# Patient Record
Sex: FEMALE | Race: WHITE | NOT HISPANIC OR LATINO | Employment: OTHER | URBAN - METROPOLITAN AREA
[De-identification: names, ages, dates, MRNs, and addresses within clinical notes are randomized per-mention and may not be internally consistent; named-entity substitution may affect disease eponyms.]

---

## 2017-08-07 ENCOUNTER — TRANSCRIBE ORDERS (OUTPATIENT)
Dept: ADMINISTRATIVE | Facility: HOSPITAL | Age: 52
End: 2017-08-07

## 2017-08-07 DIAGNOSIS — Z12.31 VISIT FOR SCREENING MAMMOGRAM: Primary | ICD-10-CM

## 2017-08-11 ENCOUNTER — HOSPITAL ENCOUNTER (OUTPATIENT)
Dept: MAMMOGRAPHY | Facility: HOSPITAL | Age: 52
Discharge: HOME | End: 2017-08-11
Attending: NURSE PRACTITIONER | Admitting: NURSE PRACTITIONER

## 2017-08-11 DIAGNOSIS — Z12.31 VISIT FOR SCREENING MAMMOGRAM: ICD-10-CM

## 2017-08-11 PROCEDURE — 77063 BREAST TOMOSYNTHESIS BI: CPT

## 2017-08-11 PROCEDURE — G0202 SCR MAMMO BI INCL CAD: HCPCS

## 2017-08-11 PROCEDURE — 77067 SCR MAMMO BI INCL CAD: CPT | Performed by: RADIOLOGY

## 2017-08-11 PROCEDURE — 77063 BREAST TOMOSYNTHESIS BI: CPT | Performed by: RADIOLOGY

## 2017-10-04 ENCOUNTER — APPOINTMENT (OUTPATIENT)
Dept: MAMMOGRAPHY | Facility: HOSPITAL | Age: 52
End: 2017-10-04

## 2018-08-15 ENCOUNTER — TRANSCRIBE ORDERS (OUTPATIENT)
Dept: ADMINISTRATIVE | Facility: HOSPITAL | Age: 53
End: 2018-08-15

## 2018-08-15 DIAGNOSIS — Z12.31 VISIT FOR SCREENING MAMMOGRAM: Primary | ICD-10-CM

## 2018-08-30 ENCOUNTER — HOSPITAL ENCOUNTER (OUTPATIENT)
Dept: MAMMOGRAPHY | Facility: HOSPITAL | Age: 53
Discharge: HOME | End: 2018-08-30
Attending: NURSE PRACTITIONER | Admitting: NURSE PRACTITIONER

## 2018-08-30 DIAGNOSIS — Z12.31 VISIT FOR SCREENING MAMMOGRAM: ICD-10-CM

## 2018-08-30 PROCEDURE — 77067 SCR MAMMO BI INCL CAD: CPT | Performed by: RADIOLOGY

## 2018-08-30 PROCEDURE — 77067 SCR MAMMO BI INCL CAD: CPT

## 2018-08-30 PROCEDURE — 77063 BREAST TOMOSYNTHESIS BI: CPT | Performed by: RADIOLOGY

## 2018-08-30 PROCEDURE — 77063 BREAST TOMOSYNTHESIS BI: CPT

## 2019-08-12 ENCOUNTER — TRANSCRIBE ORDERS (OUTPATIENT)
Dept: ADMINISTRATIVE | Facility: HOSPITAL | Age: 54
End: 2019-08-12

## 2019-08-12 DIAGNOSIS — Z12.31 VISIT FOR SCREENING MAMMOGRAM: Primary | ICD-10-CM

## 2019-09-30 ENCOUNTER — HOSPITAL ENCOUNTER (OUTPATIENT)
Dept: MAMMOGRAPHY | Facility: HOSPITAL | Age: 54
Discharge: HOME | End: 2019-09-30
Admitting: NURSE PRACTITIONER

## 2019-09-30 DIAGNOSIS — Z12.31 VISIT FOR SCREENING MAMMOGRAM: ICD-10-CM

## 2019-09-30 PROCEDURE — 77067 SCR MAMMO BI INCL CAD: CPT | Performed by: RADIOLOGY

## 2019-09-30 PROCEDURE — 77067 SCR MAMMO BI INCL CAD: CPT

## 2019-09-30 PROCEDURE — 77063 BREAST TOMOSYNTHESIS BI: CPT

## 2019-09-30 PROCEDURE — 77063 BREAST TOMOSYNTHESIS BI: CPT | Performed by: RADIOLOGY

## 2019-10-02 ENCOUNTER — TELEPHONE (OUTPATIENT)
Dept: MAMMOGRAPHY | Facility: HOSPITAL | Age: 54
End: 2019-10-02

## 2019-10-02 NOTE — TELEPHONE ENCOUNTER
Patient called in with questions regarding the need for additional views. Questions answered, support given.  Encouraged patient to call back with questions/concerns.  Patient verbalized understanding.

## 2019-10-04 ENCOUNTER — HOSPITAL ENCOUNTER (OUTPATIENT)
Dept: ULTRASOUND IMAGING | Facility: HOSPITAL | Age: 54
Discharge: HOME | End: 2019-10-04
Admitting: RADIOLOGY

## 2019-10-04 DIAGNOSIS — R92.8 ABNORMAL MAMMOGRAM: ICD-10-CM

## 2019-10-04 PROCEDURE — 76642 ULTRASOUND BREAST LIMITED: CPT

## 2019-10-04 PROCEDURE — 76642 ULTRASOUND BREAST LIMITED: CPT | Performed by: RADIOLOGY

## 2020-08-19 ENCOUNTER — TRANSCRIBE ORDERS (OUTPATIENT)
Dept: ADMINISTRATIVE | Facility: HOSPITAL | Age: 55
End: 2020-08-19

## 2020-08-19 DIAGNOSIS — Z12.31 VISIT FOR SCREENING MAMMOGRAM: Primary | ICD-10-CM

## 2020-11-12 ENCOUNTER — HOSPITAL ENCOUNTER (OUTPATIENT)
Dept: MAMMOGRAPHY | Facility: HOSPITAL | Age: 55
Discharge: HOME | End: 2020-11-12
Admitting: NURSE PRACTITIONER

## 2020-11-12 DIAGNOSIS — Z12.31 VISIT FOR SCREENING MAMMOGRAM: ICD-10-CM

## 2020-11-12 PROCEDURE — 77063 BREAST TOMOSYNTHESIS BI: CPT

## 2020-11-12 PROCEDURE — 77063 BREAST TOMOSYNTHESIS BI: CPT | Performed by: RADIOLOGY

## 2020-11-12 PROCEDURE — 77067 SCR MAMMO BI INCL CAD: CPT | Performed by: RADIOLOGY

## 2020-11-12 PROCEDURE — 77067 SCR MAMMO BI INCL CAD: CPT

## 2021-09-28 ENCOUNTER — TRANSCRIBE ORDERS (OUTPATIENT)
Dept: ADMINISTRATIVE | Facility: HOSPITAL | Age: 56
End: 2021-09-28

## 2021-09-28 DIAGNOSIS — Z12.31 VISIT FOR SCREENING MAMMOGRAM: Primary | ICD-10-CM

## 2021-11-15 ENCOUNTER — APPOINTMENT (OUTPATIENT)
Dept: MAMMOGRAPHY | Facility: HOSPITAL | Age: 56
End: 2021-11-15

## 2021-12-23 ENCOUNTER — HOSPITAL ENCOUNTER (OUTPATIENT)
Dept: MAMMOGRAPHY | Facility: HOSPITAL | Age: 56
Discharge: HOME | End: 2021-12-23
Admitting: NURSE PRACTITIONER

## 2021-12-23 DIAGNOSIS — Z12.31 VISIT FOR SCREENING MAMMOGRAM: ICD-10-CM

## 2021-12-23 PROCEDURE — 77067 SCR MAMMO BI INCL CAD: CPT | Performed by: RADIOLOGY

## 2021-12-23 PROCEDURE — 77063 BREAST TOMOSYNTHESIS BI: CPT

## 2021-12-23 PROCEDURE — 77063 BREAST TOMOSYNTHESIS BI: CPT | Performed by: RADIOLOGY

## 2021-12-23 PROCEDURE — 77067 SCR MAMMO BI INCL CAD: CPT

## 2022-11-03 ENCOUNTER — TRANSCRIBE ORDERS (OUTPATIENT)
Dept: ADMINISTRATIVE | Facility: HOSPITAL | Age: 57
End: 2022-11-03

## 2022-11-03 DIAGNOSIS — Z12.31 SCREENING MAMMOGRAM FOR BREAST CANCER: Primary | ICD-10-CM

## 2022-12-27 ENCOUNTER — HOSPITAL ENCOUNTER (OUTPATIENT)
Dept: MAMMOGRAPHY | Facility: HOSPITAL | Age: 57
Discharge: HOME | End: 2022-12-27
Admitting: OBSTETRICS & GYNECOLOGY

## 2022-12-27 DIAGNOSIS — Z12.31 SCREENING MAMMOGRAM FOR BREAST CANCER: ICD-10-CM

## 2022-12-27 PROCEDURE — 77067 SCR MAMMO BI INCL CAD: CPT | Performed by: RADIOLOGY

## 2022-12-27 PROCEDURE — 77067 SCR MAMMO BI INCL CAD: CPT

## 2022-12-27 PROCEDURE — 77063 BREAST TOMOSYNTHESIS BI: CPT | Performed by: RADIOLOGY

## 2022-12-27 PROCEDURE — 77063 BREAST TOMOSYNTHESIS BI: CPT

## 2023-11-08 ENCOUNTER — TRANSCRIBE ORDERS (OUTPATIENT)
Dept: ADMINISTRATIVE | Facility: HOSPITAL | Age: 58
End: 2023-11-08
Payer: COMMERCIAL

## 2023-11-08 DIAGNOSIS — Z12.31 SCREENING MAMMOGRAM FOR BREAST CANCER: Primary | ICD-10-CM

## 2024-01-02 ENCOUNTER — HOSPITAL ENCOUNTER (OUTPATIENT)
Dept: MAMMOGRAPHY | Facility: HOSPITAL | Age: 59
Discharge: HOME | End: 2024-01-02
Admitting: OBSTETRICS & GYNECOLOGY
Payer: COMMERCIAL

## 2024-01-02 DIAGNOSIS — Z12.31 SCREENING MAMMOGRAM FOR BREAST CANCER: ICD-10-CM

## 2024-01-02 PROCEDURE — 77067 SCR MAMMO BI INCL CAD: CPT

## 2024-01-02 PROCEDURE — 77063 BREAST TOMOSYNTHESIS BI: CPT

## 2024-01-11 ENCOUNTER — HOSPITAL ENCOUNTER (OUTPATIENT)
Dept: ULTRASOUND IMAGING | Facility: HOSPITAL | Age: 59
Discharge: HOME | End: 2024-01-11
Payer: COMMERCIAL

## 2024-01-11 ENCOUNTER — HOSPITAL ENCOUNTER (OUTPATIENT)
Dept: MAMMOGRAPHY | Facility: HOSPITAL | Age: 59
Discharge: HOME | End: 2024-01-11
Payer: COMMERCIAL

## 2024-01-11 DIAGNOSIS — R92.8 ABNORMAL MAMMOGRAM: ICD-10-CM

## 2024-01-11 PROCEDURE — G0279 TOMOSYNTHESIS, MAMMO: HCPCS

## 2024-01-11 PROCEDURE — 76642 ULTRASOUND BREAST LIMITED: CPT

## 2024-01-11 PROCEDURE — 77065 DX MAMMO INCL CAD UNI: CPT

## 2024-03-14 ENCOUNTER — LAB (OUTPATIENT)
Dept: LAB | Facility: HOSPITAL | Age: 59
End: 2024-03-14
Payer: COMMERCIAL

## 2024-03-14 ENCOUNTER — OFFICE VISIT (OUTPATIENT)
Dept: CARDIOLOGY | Facility: CLINIC | Age: 59
End: 2024-03-14
Payer: COMMERCIAL

## 2024-03-14 VITALS
OXYGEN SATURATION: 97 % | SYSTOLIC BLOOD PRESSURE: 136 MMHG | WEIGHT: 130 LBS | BODY MASS INDEX: 20.4 KG/M2 | HEIGHT: 67 IN | DIASTOLIC BLOOD PRESSURE: 88 MMHG

## 2024-03-14 DIAGNOSIS — Z91.89 CARDIOVASCULAR RISK FACTOR: ICD-10-CM

## 2024-03-14 DIAGNOSIS — Z91.89 CARDIOVASCULAR RISK FACTOR: Primary | ICD-10-CM

## 2024-03-14 LAB
ALBUMIN SERPL-MCNC: 4.4 G/DL (ref 3.5–5.2)
ALBUMIN/GLOB SERPL: 2.1 G/DL
ALP SERPL-CCNC: 43 U/L (ref 39–117)
ALT SERPL W P-5'-P-CCNC: 42 U/L (ref 1–33)
ANION GAP SERPL CALCULATED.3IONS-SCNC: 8 MMOL/L (ref 5–15)
AST SERPL-CCNC: 32 U/L (ref 1–32)
BASOPHILS # BLD AUTO: 0.05 10*3/MM3 (ref 0–0.2)
BASOPHILS NFR BLD AUTO: 0.7 % (ref 0–1.5)
BILIRUB SERPL-MCNC: 0.4 MG/DL (ref 0–1.2)
BUN SERPL-MCNC: 22 MG/DL (ref 6–20)
BUN/CREAT SERPL: 30.1 (ref 7–25)
CALCIUM SPEC-SCNC: 9.3 MG/DL (ref 8.6–10.5)
CHLORIDE SERPL-SCNC: 104 MMOL/L (ref 98–107)
CHOLEST SERPL-MCNC: 218 MG/DL (ref 0–200)
CO2 SERPL-SCNC: 27 MMOL/L (ref 22–29)
CREAT SERPL-MCNC: 0.73 MG/DL (ref 0.57–1)
CRP SERPL-MCNC: 0.3 MG/DL (ref 0.01–0.5)
DEPRECATED RDW RBC AUTO: 39.6 FL (ref 37–54)
EGFRCR SERPLBLD CKD-EPI 2021: 96.1 ML/MIN/1.73
EOSINOPHIL # BLD AUTO: 0.12 10*3/MM3 (ref 0–0.4)
EOSINOPHIL NFR BLD AUTO: 1.7 % (ref 0.3–6.2)
ERYTHROCYTE [DISTWIDTH] IN BLOOD BY AUTOMATED COUNT: 12.2 % (ref 12.3–15.4)
GLOBULIN UR ELPH-MCNC: 2.1 GM/DL
GLUCOSE SERPL-MCNC: 104 MG/DL (ref 65–99)
HBA1C MFR BLD: 5.4 % (ref 4.8–5.6)
HCT VFR BLD AUTO: 43.2 % (ref 34–46.6)
HDLC SERPL-MCNC: 86 MG/DL (ref 40–60)
HGB BLD-MCNC: 14.3 G/DL (ref 12–15.9)
IMM GRANULOCYTES # BLD AUTO: 0.02 10*3/MM3 (ref 0–0.05)
IMM GRANULOCYTES NFR BLD AUTO: 0.3 % (ref 0–0.5)
IRON 24H UR-MRATE: 147 MCG/DL (ref 37–145)
IRON SATN MFR SERPL: 44 % (ref 20–50)
LDLC SERPL CALC-MCNC: 119 MG/DL (ref 0–100)
LDLC/HDLC SERPL: 1.36 {RATIO}
LYMPHOCYTES # BLD AUTO: 1.01 10*3/MM3 (ref 0.7–3.1)
LYMPHOCYTES NFR BLD AUTO: 14.1 % (ref 19.6–45.3)
MCH RBC QN AUTO: 29.7 PG (ref 26.6–33)
MCHC RBC AUTO-ENTMCNC: 33.1 G/DL (ref 31.5–35.7)
MCV RBC AUTO: 89.8 FL (ref 79–97)
MONOCYTES # BLD AUTO: 0.42 10*3/MM3 (ref 0.1–0.9)
MONOCYTES NFR BLD AUTO: 5.9 % (ref 5–12)
NEUTROPHILS NFR BLD AUTO: 5.52 10*3/MM3 (ref 1.7–7)
NEUTROPHILS NFR BLD AUTO: 77.3 % (ref 42.7–76)
NRBC BLD AUTO-RTO: 0 /100 WBC (ref 0–0.2)
PLATELET # BLD AUTO: 292 10*3/MM3 (ref 140–450)
PMV BLD AUTO: 10.1 FL (ref 6–12)
POTASSIUM SERPL-SCNC: 4.3 MMOL/L (ref 3.5–5.2)
PREALB SERPL-MCNC: 24.2 MG/DL (ref 20–40)
PROT SERPL-MCNC: 6.5 G/DL (ref 6–8.5)
RBC # BLD AUTO: 4.81 10*6/MM3 (ref 3.77–5.28)
SODIUM SERPL-SCNC: 139 MMOL/L (ref 136–145)
TIBC SERPL-MCNC: 337 MCG/DL (ref 298–536)
TRANSFERRIN SERPL-MCNC: 226 MG/DL (ref 200–360)
TRIGL SERPL-MCNC: 77 MG/DL (ref 0–150)
VIT B12 BLD-MCNC: 577 PG/ML (ref 211–946)
VLDLC SERPL-MCNC: 13 MG/DL (ref 5–40)
WBC NRBC COR # BLD AUTO: 7.14 10*3/MM3 (ref 3.4–10.8)

## 2024-03-14 PROCEDURE — 86141 C-REACTIVE PROTEIN HS: CPT

## 2024-03-14 PROCEDURE — 84134 ASSAY OF PREALBUMIN: CPT

## 2024-03-14 PROCEDURE — 36415 COLL VENOUS BLD VENIPUNCTURE: CPT

## 2024-03-14 PROCEDURE — 83540 ASSAY OF IRON: CPT

## 2024-03-14 PROCEDURE — 83036 HEMOGLOBIN GLYCOSYLATED A1C: CPT

## 2024-03-14 PROCEDURE — 85025 COMPLETE CBC W/AUTO DIFF WBC: CPT

## 2024-03-14 PROCEDURE — 82652 VIT D 1 25-DIHYDROXY: CPT

## 2024-03-14 PROCEDURE — 99204 OFFICE O/P NEW MOD 45 MIN: CPT | Performed by: INTERNAL MEDICINE

## 2024-03-14 PROCEDURE — 80053 COMPREHEN METABOLIC PANEL: CPT

## 2024-03-14 PROCEDURE — 84466 ASSAY OF TRANSFERRIN: CPT

## 2024-03-14 PROCEDURE — 93000 ELECTROCARDIOGRAM COMPLETE: CPT | Performed by: INTERNAL MEDICINE

## 2024-03-14 PROCEDURE — 82607 VITAMIN B-12: CPT

## 2024-03-14 PROCEDURE — 80061 LIPID PANEL: CPT

## 2024-03-14 RX ORDER — NORETHINDRONE ACETATE AND ETHINYL ESTRADIOL AND FERROUS FUMARATE 5-7-9-7
1 KIT ORAL DAILY
COMMUNITY
Start: 2024-01-12

## 2024-03-14 RX ORDER — FLUTICASONE PROPIONATE AND SALMETEROL 100; 50 UG/1; UG/1
1 POWDER RESPIRATORY (INHALATION)
COMMUNITY
Start: 2024-01-16

## 2024-03-14 RX ORDER — MULTIPLE VITAMINS W/ MINERALS TAB 9MG-400MCG
1 TAB ORAL DAILY
COMMUNITY

## 2024-03-14 RX ORDER — ALBUTEROL SULFATE 90 UG/1
1 AEROSOL, METERED RESPIRATORY (INHALATION)
COMMUNITY

## 2024-03-14 NOTE — PROGRESS NOTES
New Cardiology Patient Office Visit      Date: 2024  Patient Name: Genny Camacho  : 1965   MRN: [id number]   PCP: Provider, No Known   Referring Provider: Referring, Self     Chief Complaint:    Chief Complaint   Patient presents with    Establish Care     Just a check up        History of Present Illness: Genny Camacho is a 58 y.o. female who is here today for evaluation for cardiac risk factors.  Her father has heart disease but that was in 70s that she knows of.  She is very active and does regular exercise.  She denies any chest pain any shortness of breath any dizziness any palpitations or any other symptoms.  Her blood pressure is a little high in the office today but she has checked her blood pressure and has been fine.  She never had a problem with her blood pressure.    She denies any weight loss or any weight gain.  She denies any paroxysmal nocturnal dyspnea.  She does not have any problem when she does exercise.      Problem List   CARDIAC  Coronary Artery Disease:   Moderate risk     Myocardium:   Normal     Valvular:   No known valvular disease     Electrical:   NSR     Pericardium:   Normal     CARDIAC RISK FACTORS:  Family History    NON-CARDIAC  Allergies    SURGERIES  Uterine Fibroid Surgery      Subjective      Review of Systems:   Review of Systems   All other systems reviewed and are negative.      Medications:   Current Outpatient Medications   Medication Sig Dispense Refill    albuterol sulfate  (90 Base) MCG/ACT inhaler Inhale 1 puff 4 (Four) Times a Day.      Collagen-Vitamin C-Biotin (COLLAGEN 1500/C PO) Take  by mouth.      multivitamin with minerals tablet tablet Take 1 tablet by mouth Daily.      norethindrone-ethinyl estradiol-iron (Tri-Legest Fe) 1-20/1-30/1-35 MG-MCG tablet Take 1 tablet by mouth Daily.      Wixela Inhub 100-50 MCG/ACT DISKUS Inhale 1 puff 2 (Two) Times a Day.       No current facility-administered medications for this  "visit.           The following portions of the patient's history were reviewed and updated as appropriate: allergies, current medications, past family history, past medical history, past social history, past surgical history and problem list.    Objective     Physical Exam:  Vital Signs:   Vitals:    03/14/24 0844 03/14/24 0848   BP: 146/94 136/88   BP Location: Right arm Left arm   Patient Position: Sitting Sitting   SpO2: 97%    Weight: 59 kg (130 lb)    Height: 170.2 cm (67\")      Body mass index is 20.36 kg/m².     Constitutional:       General: Not in acute distress.     Appearance: Healthy appearance. Not in distress.     Neck:     JVP:Not elevated     Carotid artery: No carotid bruit    Pulmonary:      Effort: Pulmonary effort is normal.      Breath sounds: Normal breath sounds. No wheezing. No rhonchi. No rales.     Cardiovascular:      Normal rate. Regular rhythm. Normal S1. Normal S2.      Murmurs: There is no murmur.      No gallop. No click. No rub.     Abdominal:      General: Bowel sounds are normal.      Palpations: Abdomen is soft.      Tenderness: There is no abdominal tenderness.    Extremities:     Pulses: Good distal pulses     Edema: No edema    Labs:  Lab Results   Component Value Date    GLUCOSE 79 09/10/2015    BUN 17 09/10/2015    CREATININE 0.7 09/10/2015    K 4.0 09/10/2015    CO2 29 09/10/2015    CALCIUM 9.3 09/10/2015    ALBUMIN 4.0 09/10/2015    AST 26 09/10/2015    ALT 28 09/10/2015     Lab Results   Component Value Date    WBC 6.45 09/10/2015    HGB 14.1 09/10/2015    HCT 40.1 09/10/2015    MCV 86.1 09/10/2015     09/10/2015       Lab Results   Component Value Date    TSH 2.881 09/10/2015             ECG 12 Lead    Date/Time: 3/14/2024 8:53 AM  Performed by: Curtis Badillo MD    Authorized by: Curtis Badillo MD  Previous ECG: no previous ECG available  Rhythm: sinus rhythm    Clinical impression: normal ECG          Smoking Cessation:   Tobacco Product History : Patient " never smoked    Advance Care Planning   ACP discussion was declined by the patient. Patient does not have an advance directive, declines further assistance.            Assessment / Plan      Assessment:   Diagnosis Plan   1. Cardiovascular risk factor             Plan:  Patient does not have any significant risk factors for coronary artery disease.  We will go ahead and check her blood work for risk assessment stratification.  We will also get calcium scoring for further adjustment if needed.  She is aware that her blood pressure is a little high in the office but she knows that it has been running fine.  Will get microalbumin urea also to look for further causes of cardiac problem.            Follow Up:   Return in about 1 year (around 3/14/2025).    Curtis Badillo MD

## 2024-03-15 ENCOUNTER — TELEPHONE (OUTPATIENT)
Dept: CARDIOLOGY | Facility: CLINIC | Age: 59
End: 2024-03-15
Payer: COMMERCIAL

## 2024-03-16 LAB — 1,25(OH)2D SERPL-MCNC: 91.6 PG/ML (ref 24.8–81.5)

## 2024-07-17 ENCOUNTER — TELEPHONE (OUTPATIENT)
Dept: CARDIOLOGY | Facility: CLINIC | Age: 59
End: 2024-07-17
Payer: COMMERCIAL

## 2024-07-17 DIAGNOSIS — R00.2 PALPITATIONS: Primary | ICD-10-CM

## 2024-07-17 NOTE — TELEPHONE ENCOUNTER
"Patient called concerned because she felt a \"PVC in her heart this morning\" and she was pretty dehydrated. She stated this has happened twice in her life time and she wants to get it checked out.  Educated patient on water intake. Will send her holter monitor.   "

## 2024-08-08 ENCOUNTER — TELEPHONE (OUTPATIENT)
Dept: CARDIOLOGY | Facility: CLINIC | Age: 59
End: 2024-08-08
Payer: COMMERCIAL

## 2024-08-09 ENCOUNTER — TELEPHONE (OUTPATIENT)
Dept: CARDIOLOGY | Facility: CLINIC | Age: 59
End: 2024-08-09

## 2024-08-09 RX ORDER — ASPIRIN 81 MG/1
81 TABLET ORAL DAILY
Qty: 90 TABLET | Refills: 3 | Status: SHIPPED | OUTPATIENT
Start: 2024-08-09

## 2024-08-09 NOTE — TELEPHONE ENCOUNTER
----- Message from Curtis Badillo sent at 8/9/2024  2:00 PM EDT -----  Please call the patient regarding her abnormal result.  She only needs to take aspirin

## 2024-08-13 ENCOUNTER — LAB (OUTPATIENT)
Dept: LAB | Facility: HOSPITAL | Age: 59
End: 2024-08-13
Payer: COMMERCIAL

## 2024-08-13 ENCOUNTER — CLINICAL SUPPORT (OUTPATIENT)
Dept: GENETICS | Facility: HOSPITAL | Age: 59
End: 2024-08-13
Payer: COMMERCIAL

## 2024-08-13 DIAGNOSIS — Z80.3 FAMILY HISTORY OF MALIGNANT NEOPLASM OF BREAST: ICD-10-CM

## 2024-08-13 DIAGNOSIS — Z13.79 GENETIC TESTING: Primary | ICD-10-CM

## 2024-08-13 DIAGNOSIS — Z80.0 FAMILY HISTORY OF PANCREATIC CANCER: ICD-10-CM

## 2024-08-13 PROCEDURE — 36415 COLL VENOUS BLD VENIPUNCTURE: CPT

## 2024-08-13 PROCEDURE — 96040: CPT

## 2024-08-13 NOTE — PROGRESS NOTES
Encounter Date: 2024   Patient Name: Genny Camacho  : 1965   MRN: [id number]     Referring Provider: Vince Guardado MD     REASON FOR VISIT  Genny Camacho is a 59 y.o. female referred for genetic counseling due to a family history of breast and pancreatic cancer.  Ms. Camacho was interested in discussing her risk for a hereditary cancer syndrome and genetic testing for cancer susceptibility.  Ms. Camacho does not have a personal history of cancer.  She reached menarche at age 14 and has never had children.  Her menopause status is unknown and has never used HRT.  She retains her ovaries and uterus.  Her most recent mammogram described her breast as heterogeneously dense.  Ms. Camacho elected to pursue genetic testing via [facility]-Expanded panel with [facility] analyzing 71-cancer-risk genes.    PERTINENT FAMILY HISTORY:  A cancer-focused four-generation pedigree was obtained via patient reporting    Paternal Grandmother - breast cancer, 79  Paternal Uncle - brain cancer, 77  Paternal 1st Cousin - pancreatic cancer, 58  Paternal Great Uncle (brother of PGF) - unknown cancer, 80  Maternal Uncle - esophageal cancer, 88  Maternal 1st Cousin - follicular thyroid cancer, 40    RISK ASSESSMENT  Ms. Camacho's reported family history is suggestive of a hereditary cancer risk.  Ms. Camacho meets NCCN guidelines criteria for genetic testing based on she has a paternal second degree relative diagnosed with breast cancer whom has a close blood relative diagnosed with pancreatic cancer.  A significant proportion (>50%) of hereditary breast and ovarian cancer can be attributed to mutations in the BRCA1 and BRCA2 genes.  Females with mutations in BRCA1/2  can have a lifetime breast cancer risk up to 60 - 84%, while the general population risk for breast cancer is 12 - 13%.  BRCA1 mutations can also increase the lifetime risk for ovarian cancer up to 58% and BRCA2 mutations can  increase this risk up to 29%.  The general population lifetime ovarian cancer risk is 1 - 2%.  BRCA1/2 mutations can also significantly increase the lifetime risk of certain cancers in males such as prostate cancer.  Mutations in these two genes can also increase the risk for pancreatic cancer and male breast cancer.  There are other clinically significant cancer related genes, as well as other, more rare, hereditary cancer syndromes than can increase risk for breast and ovarian cancers. The degree of risk and screening recommendations vary by gene, the individual's age, and related family history.    GENETIC COUNSELING  We reviewed the family history information in detail. Cases of cancer follow three general patterns: sporadic, familial, and hereditary.  While most cancer cases are sporadic (~70% of cancer cases), some cases appear to occur in family clusters.  These cases are said to be familial and account for around 20% of cancer cases.  Familial cases may be due to a combination of shared genes and environmental factors among family members that we cannot evaluate via genetic testing at this time.  In 5% - 10% of cancer cases, the risk for cancer is inherited, and the genes responsible for the increased cancer risk are known.      Family histories typical of hereditary cancer syndromes usually include multiple first- and second-degree relatives diagnosed with cancer types that define a syndrome.  These cases tend to be diagnosed at younger-than-expected ages and can be bilateral or multifocal.  The cancer in these families follows an autosomal dominant inheritance pattern, which indicates the likely presence of a mutation in a cancer risk gene.  Children and siblings of an individual carrying a mutation have a 50% chance of inheriting that mutation, thereby inheriting the increased risk to develop cancer.  However, it is not recommended to pursue genetic testing for adult-onset cancers in children as the  "results would not have clinical utility until some time in adulthood among other ethical reasons.  These mutations can be passed down from the maternal or the paternal lineage.    We discussed that risk factors or \"red flags\" for hereditary risk include cancers diagnosed at earlier-than-average ages, multiple family members diagnosed with cancers associated with mutations in the same gene, or multiple generations of associated cancers. Dependent on the specific cancer type or syndrome, there exists the potential for several clinically significant genes related to the cancer's onset or increased risk for development.  Therefore, the standard approach to hereditary cancer genetic testing at this time is via multi-gene panel analyzing several genes associated with a hereditary risk for cancer.  Once results are completed, we will review them in the context of the patient's personal and family history to determine what, if any, post-test cancer risk management changes are recommended.  When affected relatives are unavailable or unwilling to pursue genetic testing, it is reasonable to offer genetic testing to an unaffected individual.      GENETIC TESTING  The risks, benefits, and limitations of genetic testing and implications for clinical management following testing were reviewed.  Genetic test results can influence decisions regarding screening and prevention.  Genetic testing can have significant psychological implications for both individuals and families. Also discussed was the possibility of insurance discrimination based on genetic test results and the laws in place to prevent this, as well as the limitations of these laws.      The Genetic Information Nondiscrimination Act (MCKENZIE) is a federal law enacted in May 2008.  [place] prohibits discrimination on the basis of genetic information such as genetic test results with respect to health insurance and employment status.  Under Title 1 of [place], health insurance " "companies are prohibited from using an individual's genetic information to change premiums, drop or change an individual's coverage, or prevent coverage from being acquired.  Title 2 of [place]CK[place] prohibits employers from using genetic information in employment decisions such as, but not limited to, hiring, firing, promotions, pay, and job assignments.  MCKENZIE protections do not protect against genetic discrimination by life insurance, long-term care or disability insurance,  service members, federal employees, those using the  Health Service, or those employed by a small business with fewer than 15 employees.    We discussed panel testing, which could involve testing numerous genes associated with increased cancer risk. The implications of a positive,negative, or VUS test result were discussed.  We also discussed the importance of testing an affected relative. In general, a negative genetic test result is most informative if a mutation has first been established in an affected member of the family.  In cases where an affected individual is not available or interested in testing, it is appropriate to offer testing to an unaffected individual.     With multigene panel testing, it is not uncommon for a variant of uncertain significance (VUS) to be identified.  Variants are termed \"VUS\" when there is not sufficient evidence to classify the variant as a negative (not harmful) variant or a positive (harmful) mutation. Genetic testing labs work to reclassify all VUSs overtime and will issue an updated report when a reclassification occurs.  The majority (over 90%) of VUSs that are reclassified are found to be negative genetic variants, however a small percentage will be upgraded to a harmful mutation. Clinically, a VUS is treated as a negative result.  If genetic testing is negative or a variant of uncertain significance (VUS) is found, management should be guided by a family history-based risk assessment.  Medical " care should not be altered based on a VUS result.  Genetic testing for other unaffected (never had cancer) relatives is not recommended for a VUS.    We discussed that there are limitations to testing an individual who has not had cancer.  A negative test result does not mean Ms. Wilkinss risk for cancer is low or even normal, although the risk would not be as high as it would with positive (harmful mutation) result.  It could still be increased over the general population based on family history alone.  If Ms. Camacho tests negative it could be for several different reasons such as:    The possibility that there is a pathogenic variant causing cancer in the family, and that gene was on the patient's test, but Ms. Camacho did not inherit it. We cannot know if this is the case by only testing the patient.  A familial mutation could therefore still be present in the family and other close family members are still at risk.  Ms. Camacho could have a pathogenic variant in one of the genes tested for that was not detected. Current testing will identify the overwhelming majority of pathogenic variants, but some are not detectable with current technology.   Ms. Camacho may carry a pathogenic variant in another gene associated with hereditary cancer predisposition that was not tested for, or the risk in the family may be due to other genetic factors that are not well understood.    ASSESSMENT AND PLAN  Ms. Camacho meets NCCN guidelines for genetic testing.  We reviewed the options for genetic testing including a multi-gene panel of high risk genes, a panel of genes with known management guidelines, or a broader approach including more newly discovered genes.  We reviewed the benefits and drawbacks of this approach, including finding mutations in genes that are less well understood, or results that are unexpected based on this family history.  Ms. Camacho elected to pursue genetic testing.  [facility]Next -  Expanded panel was ordered, which analyzes 71 genes associated with an increased cancer risk. The genes on this panel include AIP, ALK, APC, MATILDA, AXIN2, BAP1, BARD1, BMPR1A, BRCA1, BRCA2, BRIP1, CDC73, CDH1, CDK4, CDKN1B, CDKN2A, CHEK2, CTNNA1, DICER1, EGFR, EGLN1, EPCAM, FANCC, FH, FLCN, GALNT12, HOXB13, KIF1B, KIT, LZTR1, MAX, MEN1, MET, MITF, MLH1, MSH2, MSH3, MSH6, MUTYH, NF1, NF2, NTHL1, PALB2, PDGFRA, PHOX2B, PMS2, POLD1, POLE, POT1, BNFCF5C, PTCH1, PTEN, RAD51C, RAD51D, RB1, RET, SDHA, SDHAF2, SDHB, SDHC, SDHD, SMAD4, SMARCA4, SMARCB1, SMARCE1, STK11, SUFU, VXLC058, TP53, TSC1, TSC2, and VHL.  A blood sample for genetic evaluation was collected at [facility].  Genetic testing results are expected in 2 - 4 weeks from the day the sample is received by [facility]'s lab.  We will contact the patient when results are received.  Ms. Camacho asked excellent questions during the consult and did not demonstrate any acute psychosocial distress during our visit.  This clinic encounter was 45 minutes.      Armando Smith MS  Genetic Counselor  [facility]

## 2024-08-26 ENCOUNTER — TELEPHONE (OUTPATIENT)
Dept: GENETICS | Facility: HOSPITAL | Age: 59
End: 2024-08-26
Payer: COMMERCIAL

## 2024-08-26 NOTE — TELEPHONE ENCOUNTER
Spoke with patient and disclosed negative genetic results. Informed patient these results would be on [facility] and sent to her Dr. Patient requested a copy be mailed to her.

## 2024-08-27 ENCOUNTER — DOCUMENTATION (OUTPATIENT)
Dept: GENETICS | Facility: HOSPITAL | Age: 59
End: 2024-08-27
Payer: COMMERCIAL

## 2024-08-27 NOTE — PROGRESS NOTES
Date of Visit: 2024  Name: Genny Camacho  : 1965  MRN: [id number]    Referring Provider: Vince Guardado MD        REASON FOR CONSULT  Genny Camacho is a 59 y.o. female referred for genetic counseling due to a family history of breast and pancreatic cancer.  Ms. Camacho was interested in discussing her risk for a hereditary cancer syndrome and genetic testing for cancer susceptibility.  Ms. Camacho does not have a personal history of cancer.  She reached menarche at age 14 and has never had children.  Her menopause status is unknown and has never used HRT.  She retains her ovaries and uterus.  Her most recent mammogram described her breast as heterogeneously dense.  Ms. Camacho elected to pursue genetic testing via [facility]Expanded panel with [facility] analyzing 71-cancer-risk genes.  Ms. Camacho's genetic test result was NEGATIVE for pathogenic mutations in all 71 - cancer risk genes analyzed.  Her estimated lifetime breast cancer risk is 12.9% as calculated by the Tyrer-Cuzick model.  Ms. Camacho was notified of these results via telephone on 2024 .     PERTINENT FAMILY HISTORY:  A cancer-focused four-generation pedigree was obtained via patient reporting     Paternal Grandmother - breast cancer, 79  Paternal Uncle - brain cancer, 77  Paternal 1st Cousin - pancreatic cancer, 58  Paternal Great Uncle (brother of PGF) - unknown cancer, 80  Maternal Uncle - esophageal cancer, 88  Maternal 1st Cousin - follicular thyroid cancer, 40    GENETIC TESTING RESULTS AND RECOMMENDATIONS  Genetic testing was performed by [facility] analyzing 71-cancer-risk genes.    Genetic testing was negative for pathogenic mutations by sequencing, rearrangement testing, and RNA analysis for the 71 genes analyzed.  This result does not clarify the cause of Ms. Camacho's family history of cancer, nor does it absolutely rule out a hereditary cause either.  There could  be a mutation in the family that explains Ms. Camacho's family history of cancer that she did not inherit.  There could be a mutation in the family that explains the pattern of cancer that we cannot identify at this time or in a gene that was not tested.  There could be a mutation in one of the genes tested that was not detected. Current genetic testing technologies will identify the majority of mutations with high accuracy, but some remain undetectable at this time.  Ms. Wilkinss family history of cancer could be occurring not due to a single gene mutation, but due to multiple undetectable genetic and environmental factors.  We encourage patients to reach out over time to inquire about any new methodologies of testing or newly identified cancer risk genes that could explain their personal or family history of cancer.    Increased cancer risks for Ms. Camacho and her family may remain due to her family history of cancer.  The Tyrer-Cuzick model (MIRNA) is a validated female breast cancer risk tool that assesses an unaffected (has not had breast cancer) female's lifetime breast cancer risk.  Tyrer-Cuzick version 8.0b was used to calculate Ms. Wilkinss lifetime breast cancer risk.  Ms. Camacho's estimated lifetime breast cancer risk is 12.9%.  This model predicts the average 58 y.o. unaffected female's lifetime risk of breast cancer to be 7.7%.  The National Comprehensive Cancer Network guidelines (version 2.2024) recommend the following for 58 y.o. females who have an less than a 20% lifetime risk of breast cancer calculated from validated models such as the Tyrer-Cuzick model:    Annual clinical breast exam  Annual mammogram with tomosynthesis  Practice breast awareness    GENETIC COUNSELING  We reviewed the family history and her personal history information in detail. Cases of cancer follow three general patterns: sporadic, familial, and hereditary.  While most cancer cases are sporadic (~70% of cancer cases),  "some cases appear to occur in family clusters.  These cases are said to be familial and account for around 20% of cancer cases.  Familial cases may be due to a combination of shared genes and environmental factors among family members that we cannot evaluate via genetic testing at this time.  In 5% - 10% of cancer cases, the risk for cancer is inherited, and the genes responsible for the increased cancer risk are known.       Family histories typical of hereditary cancer syndromes usually include multiple first- and second-degree relatives diagnosed with cancer types that define a syndrome.  These cases tend to be diagnosed at younger-than-expected ages and can be bilateral or multifocal.  The cancer in these families follows an autosomal dominant inheritance pattern, which indicates the likely presence of a mutation in a cancer gene.  Children and siblings of an individual carrying a mutation have a 50% chance of inheriting that mutation, thereby inheriting the increased risk to develop cancer.  However, it is not recommended to pursue genetic testing for adult-onset cancers in children as the results would not have clinical utility until some time in adulthood among other ethical reasons.  These mutations can be passed down from the maternal or the paternal lineage.     We discussed that risk factors or \"red flags\" for hereditary risk include cancers diagnosed at earlier-than-average ages, multiple family members diagnosed with cancers associated with mutations in the same gene, or multiple generations of associated cancers. Dependent on the specific cancer type or syndrome, there exists the potential for several clinically significant genes related to the cancer's onset or increased risk for development.  Therefore, the standard approach to hereditary cancer genetic testing at this time is via multi-gene panel analyzing several genes associated with a hereditary risk for cancer.  We reviewed the results in the " "context of the patient's personal and family history to determine what, if any, post-test cancer risk management changes are recommended.     GENETIC TESTING  The risks, benefits, and limitations of genetic testing and implications for clinical management following testing were reviewed.  Genetic test results can influence decisions regarding screening and prevention.  Genetic testing can have significant psychological implications for both individuals and families. Also discussed was the possibility of insurance discrimination based on genetic test results and the laws in place to prevent this, as well as the limitations of these laws.       The Genetic Information Nondiscrimination Act (MCKENZIE) is a federal law enacted in May 2008.  [place]IE prohibits discrimination on the basis of genetic information such as genetic test results with respect to health insurance and employment status.  Under Title 1 of [place], health insurance companies are prohibited from using an individual's genetic information to change premiums, drop or change an individual's coverage, or prevent coverage from being acquired.  Title 2 of MCK[place]IE prohibits employers from using genetic information in employment decisions such as, but not limited to, hiring, firing, promotions, pay, and job assignments.  MCKENZIE protections do not protect against genetic discrimination by life insurance, long-term care or disability insurance,  service members, federal employees, those using the [facility] [facility], or those employed by a small business with fewer than 15 employees.     We discussed the implications and limitations of a positive, negative, or variant of uncertain significance (VUS) test result.  Variants are termed \"VUS\" when there is not sufficient evidence to classify the variant as a negative (not harmful) variant or a positive (harmful) mutation. Genetic testing labs work to reclassify all VUSs overtime and will issue an updated report when a " reclassification occurs.  The majority (over 90%) of VUSs that are reclassified are found to be negative genetic variants, however a small percentage will be upgraded to a harmful mutation. Clinically, a VUS is treated as a negative result.  If genetic testing is negative or a variant of uncertain significance (VUS) is found, management should be guided by a family history-based risk assessment.  Medical care should not be altered based on a VUS result.  Genetic testing for other unaffected (never had cancer) relatives is not recommended for a VUS.     FAMILY CONSIDERATIONS  Genetic testing for Ms. Camacho's relatives may be informative despite Ms. Camacho's negative test result.  Her paternal family history of pancreatic and breast cancer can be suggestive of a hereditary cancer-causing mutation existing in the family that Ms. Camacho did not inherit.  Therefore, any of her siblings and paternal blood relatives would be appropriate candidates for genetic counseling services.  Testing unaffected family members may result in negative results as well or VUS's that would not clarify Ms. Camacho's family history of cancer.  Family members are encouraged to discuss their family history of cancer and appropriate cancer screening recommendations with their healthcare providers.  Family members are also encouraged to inquire about information regarding genetics and genetic testing, if appropriate, at a clinic that offers genetic counseling or their healthcare provider.  We would be happy to see relatives in our clinic for genetic counseling and testing.  They can request a referral from their healthcare provider to [facility] and that will prompt a coordinator to call them for an appointment.   For family members who live elsewhere, there are genetic counselors at most [facility].  They can find a genetic counselor by visiting the National Society of Genetic Counselors website at  www.nsgc.org or they can call our office and we would be happy to give them the contact information of the closest genetic counselor.        SUMMARY  Ms. Camacho's hereditary cancer genetic test identified no pathogenic mutations explaining her family history of cancer, nor increasing her personal cancer risk.  Her estimated lifetime breast cancer risk is 12.9%.  It is recommended that Ms. Camacho follow her provider's recommendations for future cancer screening and future risk reduction.  A copy of the genetic test and [facility] reports are attached to this note.  Ms. Camacho asked excellent questions during the consult and did not demonstrate any acute psychosocial distress during our visit.  This clinic encounter was 15 minutes in duration.      Armando Smith MS  Genetic Counselor  [facility]    Cc:  Vince Jj MD

## 2024-10-15 ENCOUNTER — TELEPHONE (OUTPATIENT)
Dept: MRI IMAGING | Facility: HOSPITAL | Age: 59
End: 2024-10-15
Payer: COMMERCIAL

## 2024-10-15 NOTE — TELEPHONE ENCOUNTER
Received an order for a Breast MRI from Dr. LOUIE but pt does not qualify for high risk screening as she is only a 12.5% lifetime risk. I informed her that we are working on a protocol for dense breast screening which MIGHT be available the first of the year. 20 minutes was spent explaining why this was, and why she did not qualify.  Pt expressed understanding and will schedule her regular MMG in January 2025 and call to see when the dense breast protocol will be offered.

## 2024-11-19 ENCOUNTER — TRANSCRIBE ORDERS (OUTPATIENT)
Dept: ADMINISTRATIVE | Facility: HOSPITAL | Age: 59
End: 2024-11-19
Payer: COMMERCIAL

## 2024-11-19 DIAGNOSIS — Z12.31 SCREENING MAMMOGRAM FOR BREAST CANCER: Primary | ICD-10-CM

## 2025-01-08 LAB
NCCN CRITERIA FLAG: ABNORMAL
TYRER CUZICK SCORE: 10.8

## 2025-01-09 NOTE — PROGRESS NOTES
This patient recently completed the CARE risk assessment for a mammogram appointment. Based on the patient's responses, NCCN criteria for genetic testing was met.     Navigator follow-up:   The patient had  [facility]Exp[facility] panel with [facility] in 8/2024 analyzing 71-cancer-risk genes. Ms. Camacho's genetic test result was NEGATIVE for pathogenic mutations in all 71 - cancer risk genes analyzed. Additional testing is not indicated at this time.

## 2025-01-14 ENCOUNTER — HOSPITAL ENCOUNTER (OUTPATIENT)
Dept: MAMMOGRAPHY | Facility: HOSPITAL | Age: 60
Discharge: HOME | End: 2025-01-14
Admitting: OBSTETRICS & GYNECOLOGY
Payer: COMMERCIAL

## 2025-01-14 DIAGNOSIS — Z12.31 SCREENING MAMMOGRAM FOR BREAST CANCER: ICD-10-CM

## 2025-01-14 PROCEDURE — 77063 BREAST TOMOSYNTHESIS BI: CPT

## 2025-01-14 PROCEDURE — 77067 SCR MAMMO BI INCL CAD: CPT

## 2025-03-20 ENCOUNTER — OFFICE VISIT (OUTPATIENT)
Dept: CARDIOLOGY | Facility: CLINIC | Age: 60
End: 2025-03-20
Payer: COMMERCIAL

## 2025-03-20 ENCOUNTER — PATIENT ROUNDING (BHMG ONLY) (OUTPATIENT)
Dept: CARDIOLOGY | Facility: CLINIC | Age: 60
End: 2025-03-20
Payer: COMMERCIAL

## 2025-03-20 VITALS
HEIGHT: 67 IN | SYSTOLIC BLOOD PRESSURE: 118 MMHG | OXYGEN SATURATION: 96 % | WEIGHT: 131.4 LBS | DIASTOLIC BLOOD PRESSURE: 76 MMHG | BODY MASS INDEX: 20.62 KG/M2 | HEART RATE: 60 BPM

## 2025-03-20 DIAGNOSIS — E78.2 MIXED HYPERLIPIDEMIA: Primary | ICD-10-CM

## 2025-03-20 DIAGNOSIS — I48.0 PAROXYSMAL ATRIAL FIBRILLATION: ICD-10-CM

## 2025-03-20 PROCEDURE — 93000 ELECTROCARDIOGRAM COMPLETE: CPT | Performed by: INTERNAL MEDICINE

## 2025-03-20 PROCEDURE — 99214 OFFICE O/P EST MOD 30 MIN: CPT | Performed by: INTERNAL MEDICINE

## 2025-03-20 RX ORDER — ATOVAQUONE AND PROGUANIL HYDROCHLORIDE 250; 100 MG/1; MG/1
TABLET, FILM COATED ORAL AS NEEDED
COMMUNITY
Start: 2025-02-19

## 2025-03-20 NOTE — PROGRESS NOTES
March 20, 2025    Hello, may I speak with Genny Camacho?    My name is Symone CONTRERAS     I am  with [facility]  [address]  [address]  [facility] [address] [phone].    Before we get started may I verify your date of birth? 1965    I am calling to officially welcome you to our practice and ask about your recent visit. Is this a good time to talk? yes    Tell me about your visit with us. What things went well?  everything went well with my visit        We're always looking for ways to make our patients' experiences even better. Do you have recommendations on ways we may improve?    yes  It was very cold in patient room     Overall were you satisfied with your first visit to our practice? yes       I appreciate you taking the time to speak with me today. Is there anything else I can do for you? no      Thank you, and have a great day.

## 2025-03-20 NOTE — PROGRESS NOTES
Follow-up Visit      Date: 2025  Patient Name: Genny Camacho  : 1965   MRN: [id number]     Chief Complaint:    Chief Complaint   Patient presents with    Cardiovascular risk factor       History of Present Illness: Genny Camacho is a 59 y.o. female who is here today for for follow-up on her atrial fibrillation and multiple medical problems.    Patient has been very healthy all her life.  She does active exercise.  She has been having some palpitations and found to have paroxysmal atrial fibrillation.  She was started on aspirin as she does not have any other risk factors.  She denies any shortness of breath.  She occasionally become tired and fatigued.  She denies any weight loss or any weight gain.  She denies any bleeding.  She denies any symptoms of claudication.      Problem List     CARDIAC  Coronary Artery Disease:   Moderate risk: 3/2024:Calcium score 18    Myocardium:   Normal     Valvular:   No known valvular disease     Electrical:   PAF  [id number] SCORE/RISK:1/0.9%      Pericardium:   Normal     CARDIAC RISK FACTORS:  Family History  Hyperlipidemia  3/14/2024: TG 77 HDL 86     NON-CARDIAC  Allergies    SURGERIES  Uterine Fibroid Surgery    Subjective      Review of Systems:   Review of Systems   Respiratory: Negative.     Cardiovascular: Negative.        Medications:     Current Outpatient Medications:     albuterol sulfate  (90 Base) MCG/ACT inhaler, Inhale 1 puff 4 (Four) Times a Day., Disp: , Rfl:     aspirin 81 MG EC tablet, Take 1 tablet by mouth Daily., Disp: 90 tablet, Rfl: 3    atovaquone-proguanil (MALARONE) 250-100 MG tablet per tablet, As Needed., Disp: , Rfl:     Collagen-Vitamin C-Biotin (COLLAGEN 1500/C PO), Take  by mouth., Disp: , Rfl:     multivitamin with minerals tablet tablet, Take 1 tablet by mouth Daily., Disp: , Rfl:     norethindrone-ethinyl estradiol-iron (Tri-Legest Fe) 1-20/1-30/1-35 MG-MCG tablet, Take 1 tablet by  "mouth Daily., Disp: , Rfl:     Wixela Inhub 100-50 MCG/ACT DISKUS, Inhale 1 puff 2 (Two) Times a Day., Disp: , Rfl:     Allergies:   Allergies   Allergen Reactions    Amoxicillin Itching       Objective     Physical Exam:  Vitals:    03/20/25 0841   BP: 118/76   BP Location: Left arm   Patient Position: Sitting   Pulse: 60   SpO2: 96%   Weight: 59.6 kg (131 lb 6.4 oz)   Height: 170.2 cm (67\")     Body mass index is 20.58 kg/m².    Constitutional:       General: Not in acute distress.     Appearance: Healthy appearance. Not in distress.     Neck:     JVP:Not elevated     Carotid artery: Normal    Pulmonary:      Effort: Pulmonary effort is normal.      Breath sounds: Normal breath sounds. No wheezing. No rhonchi. No rales.     Cardiovascular:      Normal rate. Regular rhythm. Normal S1. Normal S2.      Murmurs: There is no significant murmur.      No gallop. No click. No rub.     Abdominal:      General: Bowel sounds are normal.      Palpations: Abdomen is soft.      Tenderness: There is no abdominal tenderness.    Extremities:     Pulses:Normal radial and pedal pulses     Edema:no edema    Smoking Cessation:   Tobacco Product History : Patient never smoked    Lab Review:   Lab Results   Component Value Date    GLUCOSE 104 (H) 03/14/2024    BUN 22 (H) 03/14/2024    CREATININE 0.73 03/14/2024    BCR 30.1 (H) 03/14/2024    K 4.3 03/14/2024    CO2 27.0 03/14/2024    CALCIUM 9.3 03/14/2024    ALBUMIN 4.4 03/14/2024    AST 32 03/14/2024    ALT 42 (H) 03/14/2024     Lab Results   Component Value Date    WBC 7.14 03/14/2024    HGB 14.3 03/14/2024    HCT 43.2 03/14/2024    MCV 89.8 03/14/2024     03/14/2024     Lab Results   Component Value Date    TSH 2.881 09/10/2015           ECG 12 Lead    Date/Time: 3/20/2025 9:25 AM  Performed by: Curtis Badillo MD    Authorized by: Curtis Badillo MD  Comparison: compared with previous ECG from 3/14/2024  Similar to previous ECG  Rhythm: sinus rhythm    Clinical impression: " normal ECG           Assessment / Plan      Assessment:   Diagnosis Plan   1. Mixed hyperlipidemia  Non-HDL Cholesterol Panel    Hepatic Function Panel    Lipid Panel    High Sensitivity CRP    Microalbumin / Creatinine Urine Ratio - Urine, Clean Catch      2. Paroxysmal atrial fibrillation             Plan:  Patient cholesterol was a little high and a calcium score of 18.  We will go ahead and check it again to see if we need to treat her for any further medications.  She did not have any other episode of any irregular heartbeat.  She has been on aspirin because her risk factor is 1 with percentage of any side effects less than 1%.      Follow Up:       Return in about 18 months (around 9/20/2026).    Curtis Badillo MD